# Patient Record
Sex: MALE | URBAN - METROPOLITAN AREA
[De-identification: names, ages, dates, MRNs, and addresses within clinical notes are randomized per-mention and may not be internally consistent; named-entity substitution may affect disease eponyms.]

---

## 2020-01-20 ENCOUNTER — OFFICE VISIT (OUTPATIENT)
Dept: UROLOGY | Facility: CLINIC | Age: 75
End: 2020-01-20
Payer: COMMERCIAL

## 2020-01-20 VITALS
DIASTOLIC BLOOD PRESSURE: 75 MMHG | RESPIRATION RATE: 18 BRPM | SYSTOLIC BLOOD PRESSURE: 149 MMHG | HEART RATE: 101 BPM | BODY MASS INDEX: 26.58 KG/M2 | WEIGHT: 150 LBS | HEIGHT: 63 IN

## 2020-01-20 DIAGNOSIS — N13.8 BPH WITH OBSTRUCTION/LOWER URINARY TRACT SYMPTOMS: Primary | ICD-10-CM

## 2020-01-20 DIAGNOSIS — N40.1 BPH WITH OBSTRUCTION/LOWER URINARY TRACT SYMPTOMS: Primary | ICD-10-CM

## 2020-01-20 DIAGNOSIS — R33.9 URINARY RETENTION: ICD-10-CM

## 2020-01-20 LAB — PSA, DIAGNOSTIC: 1.32 NG/ML (ref 0.1–4)

## 2020-01-20 PROCEDURE — 1159F MED LIST DOCD IN RCRD: CPT | Mod: S$GLB,,, | Performed by: NURSE PRACTITIONER

## 2020-01-20 PROCEDURE — 1126F AMNT PAIN NOTED NONE PRSNT: CPT | Mod: S$GLB,,, | Performed by: NURSE PRACTITIONER

## 2020-01-20 PROCEDURE — 51798 US URINE CAPACITY MEASURE: CPT | Mod: S$GLB,,, | Performed by: NURSE PRACTITIONER

## 2020-01-20 PROCEDURE — 99204 OFFICE O/P NEW MOD 45 MIN: CPT | Mod: 25,S$GLB,, | Performed by: NURSE PRACTITIONER

## 2020-01-20 PROCEDURE — 51798 PR MEAS,POST-VOID RES,US,NON-IMAGING: ICD-10-PCS | Mod: S$GLB,,, | Performed by: NURSE PRACTITIONER

## 2020-01-20 PROCEDURE — 99204 PR OFFICE/OUTPT VISIT, NEW, LEVL IV, 45-59 MIN: ICD-10-PCS | Mod: 25,S$GLB,, | Performed by: NURSE PRACTITIONER

## 2020-01-20 PROCEDURE — 1159F PR MEDICATION LIST DOCUMENTED IN MEDICAL RECORD: ICD-10-PCS | Mod: S$GLB,,, | Performed by: NURSE PRACTITIONER

## 2020-01-20 PROCEDURE — 36415 COLL VENOUS BLD VENIPUNCTURE: CPT | Mod: S$GLB,,, | Performed by: NURSE PRACTITIONER

## 2020-01-20 PROCEDURE — 1101F PT FALLS ASSESS-DOCD LE1/YR: CPT | Mod: CPTII,S$GLB,, | Performed by: NURSE PRACTITIONER

## 2020-01-20 PROCEDURE — 36415 PR COLLECTION VENOUS BLOOD,VENIPUNCTURE: ICD-10-PCS | Mod: S$GLB,,, | Performed by: NURSE PRACTITIONER

## 2020-01-20 PROCEDURE — 1126F PR PAIN SEVERITY QUANTIFIED, NO PAIN PRESENT: ICD-10-PCS | Mod: S$GLB,,, | Performed by: NURSE PRACTITIONER

## 2020-01-20 PROCEDURE — 1101F PR PT FALLS ASSESS DOC 0-1 FALLS W/OUT INJ PAST YR: ICD-10-PCS | Mod: CPTII,S$GLB,, | Performed by: NURSE PRACTITIONER

## 2020-01-20 RX ORDER — TAMSULOSIN HYDROCHLORIDE 0.4 MG/1
0.8 CAPSULE ORAL DAILY
COMMUNITY
Start: 2020-01-15

## 2020-01-20 RX ORDER — METFORMIN HYDROCHLORIDE 500 MG/1
TABLET, EXTENDED RELEASE ORAL
COMMUNITY
Start: 2019-11-18

## 2020-01-20 RX ORDER — DIAZEPAM 2 MG/1
TABLET ORAL
COMMUNITY
Start: 2020-01-14

## 2020-01-20 NOTE — PROGRESS NOTES
Chief Complaint:   Chief Complaint   Patient presents with    Other     urinary retention       HPI:  74-year-old male who presents as a new patient for evaluation of urinary retention.  Patient speaks little English, daughter at bedside for interpretation, patient and daughter declined office provided .  Patient initially presented to his PCP Dr. Ashley Posada at [facility] with complaints of difficulty urinating with urinary retention.  He had a catheter placed at 1 time which caused an increase in dysuria.  He has been on Flomax 0.4 mg for some time, was recently increased to 2 capsules daily (1/15/2020).  Patient reports gradual improvement of symptoms since the increase in dosing.  He denies any further episodes of dysuria; or any fever, chills, night sweats, or hematuria.  No other urinary complaints voiced.    Allergies:  Patient has no known allergies.    Medications:  has a current medication list which includes the following prescription(s): diazepam, metformin, and tamsulosin.    Review of Systems:  General: No fever, chills, vision changes, dizziness, weakness, fatigue, unexplained weight loss, confusion, or mood swings.  Skin: No rashes, itching, or changes in color/texture of skin.  Chest: Denies JARQUIN, cyanosis, wheezing, cough, and sputum production.  Abdomen: Appetite fine. Denies diarrhea, abdominal pain, hematemesis, or blood in stool.  Musculoskeletal: No joint stiffness or swelling. Denies back pain.  : As above.  All other review of systems negative.    PMH:   has a past medical history of Diabetes mellitus, Hypertension, Sleep disorder, and Urinary retention due to benign prostatic hyperplasia.    PSH:   has no past surgical history on file.    FamHx: family history is not on file.    SocHx:  reports that he has never smoked. He has never used smokeless tobacco. He reports that he drinks alcohol. He reports that he does not use drugs.      Physical  Exam:  Vitals:    01/20/20 0924   BP: (!) 149/75   Pulse: 101   Resp: 18     General: AAOx3, no apparent distress, no deformities  Neck: supple, no masses, normal thyroid  Lungs: normal inspiration, no use of accessory muscles  Heart: regular rate and rhythm, no arrhythmias  Abdomen: soft, NT, ND, no masses, no hernias, no hepatosplenomegaly  Lymphatic: no unusually enlarged or tender lymph nodes  Skin: warm and dry, no jaundice.  Ext: without edema or deformity.  : deferred    Labs/Studies: bladder scan PV 19mL    Impression/Plan:   BPH with obstruction/lower urinary tract symptoms  Comments:  maintained on Flomax, recently increased to 2 capsules daily with improvement, continue current dosing x 6 weeks then will re-eval, PSA drawn today  Orders:  -     Prostate Specific Antigen, Diagnostic  -     POCT Urinalysis (w/Micro Option)    Urinary retention  Comments:  reported history requiring catheter, no evidence of retention today, bladder scan PV 19mL        Follow up in about 6 weeks (around 3/2/2020) for reassessment of symptoms.

## 2020-01-22 ENCOUNTER — TELEPHONE (OUTPATIENT)
Dept: UROLOGY | Facility: CLINIC | Age: 75
End: 2020-01-22

## 2020-01-22 NOTE — TELEPHONE ENCOUNTER
----- Message from Verónica Noonan NP sent at 1/20/2020  3:13 PM CST -----  PSA stable, please call patient and let him know the result, thanks!